# Patient Record
Sex: MALE | Race: WHITE | NOT HISPANIC OR LATINO | URBAN - METROPOLITAN AREA
[De-identification: names, ages, dates, MRNs, and addresses within clinical notes are randomized per-mention and may not be internally consistent; named-entity substitution may affect disease eponyms.]

---

## 2021-07-14 ENCOUNTER — APPOINTMENT (RX ONLY)
Dept: URBAN - METROPOLITAN AREA CLINIC 322 | Facility: CLINIC | Age: 26
Setting detail: DERMATOLOGY
End: 2021-07-14

## 2021-07-14 DIAGNOSIS — L21.8 OTHER SEBORRHEIC DERMATITIS: ICD-10-CM

## 2021-07-14 DIAGNOSIS — B07.8 OTHER VIRAL WARTS: ICD-10-CM

## 2021-07-14 DIAGNOSIS — L64.8 OTHER ANDROGENIC ALOPECIA: ICD-10-CM

## 2021-07-14 DIAGNOSIS — L65.0 TELOGEN EFFLUVIUM: ICD-10-CM

## 2021-07-14 PROCEDURE — ? FULL BODY SKIN EXAM - DECLINED

## 2021-07-14 PROCEDURE — ? PRESCRIPTION

## 2021-07-14 PROCEDURE — ? COUNSELING

## 2021-07-14 PROCEDURE — 99204 OFFICE O/P NEW MOD 45 MIN: CPT

## 2021-07-14 RX ORDER — KETOCONAZOLE 20 MG/ML
SHAMPOO, SUSPENSION TOPICAL
Qty: 1 | Refills: 2 | Status: ERX | COMMUNITY
Start: 2021-07-14

## 2021-07-14 RX ORDER — FINASTERIDE 1 MG/1
TABLET, FILM COATED ORAL
Qty: 90 | Refills: 3 | Status: ERX | COMMUNITY
Start: 2021-07-14

## 2021-07-14 RX ORDER — FLUOROURACIL 2 G/40G
CREAM TOPICAL QD
Qty: 1 | Refills: 1 | Status: ERX | COMMUNITY
Start: 2021-07-14

## 2021-07-14 RX ADMIN — FLUOROURACIL 1: 2 CREAM TOPICAL at 00:00

## 2021-07-14 RX ADMIN — KETOCONAZOLE 1: 20 SHAMPOO, SUSPENSION TOPICAL at 00:00

## 2021-07-14 RX ADMIN — FINASTERIDE 1: 1 TABLET, FILM COATED ORAL at 00:00

## 2021-07-14 ASSESSMENT — LOCATION SIMPLE DESCRIPTION DERM
LOCATION SIMPLE: LEFT SCALP
LOCATION SIMPLE: SCALP

## 2021-07-14 ASSESSMENT — LOCATION DETAILED DESCRIPTION DERM
LOCATION DETAILED: LEFT MEDIAL FRONTAL SCALP
LOCATION DETAILED: LEFT SUPERIOR PARIETAL SCALP

## 2021-07-14 ASSESSMENT — LOCATION ZONE DERM: LOCATION ZONE: SCALP

## 2021-07-14 NOTE — HPI: WARTS (VERRUCA)
Is This A New Presentation, Or A Follow-Up?: Warts
How Severe Are Your Warts?: mild
Additional History: Pt declines fbse today

## 2021-07-14 NOTE — PROCEDURE: COUNSELING
Detail Level: Zone
Patient Specific Counseling (Will Not Stick From Patient To Patient): Pt to start on ketoconazole 2% shampoo qday
Patient Specific Counseling (Will Not Stick From Patient To Patient): Pt to start on rogaine 5% qhs as tolerated, finasteride 1mg qday and discussed prp as treatment including risks vs benefits.  Pt to f/u for PRP
Patient Specific Counseling (Will Not Stick From Patient To Patient): Pt continue to have treated by [facility] and keep coming back.  pt to treat knees with efudex 5% cream bid for 2 weeks

## 2021-07-14 NOTE — HPI: HAIR LOSS
Previous Labs: No
How Did The Hair Loss Occur?: sudden in onset
How Severe Is Your Hair Loss?: mild
Additional History: Pt states he had COVID-19 in 06/2020 AND 01/2021, and experienced significant hair loss following illness.\\nPt states he had the COVID-19 vaccine 03/20/21 and 04/10/21.

## 2021-07-27 ENCOUNTER — APPOINTMENT (RX ONLY)
Dept: URBAN - METROPOLITAN AREA CLINIC 322 | Facility: CLINIC | Age: 26
Setting detail: DERMATOLOGY
End: 2021-07-27

## 2021-07-27 DIAGNOSIS — Z41.9 ENCOUNTER FOR PROCEDURE FOR PURPOSES OTHER THAN REMEDYING HEALTH STATE, UNSPECIFIED: ICD-10-CM

## 2021-07-27 PROCEDURE — ? PLATELET RICH PLASMA INJECTION

## 2021-07-27 ASSESSMENT — LOCATION DETAILED DESCRIPTION DERM: LOCATION DETAILED: POSTERIOR MID-PARIETAL SCALP

## 2021-07-27 ASSESSMENT — LOCATION SIMPLE DESCRIPTION DERM: LOCATION SIMPLE: POSTERIOR SCALP

## 2021-07-27 ASSESSMENT — LOCATION ZONE DERM: LOCATION ZONE: SCALP

## 2021-07-27 NOTE — PROCEDURE: PLATELET RICH PLASMA INJECTION
Price (Use Numbers Only, No Special Characters Or $): 6634 Price (Use Numbers Only, No Special Characters Or $): 2748

## 2021-07-27 NOTE — PROCEDURE: PLATELET RICH PLASMA INJECTION
Post-Care Instructions: After the procedure, take precautions agains sun exposure.  Advised would continue use of minoxidil within 48 hours of treatment.\\n\\nPt is purchasing a package of 3 prp for $1450 and proscriptx\\nToday is treatment 1/3

## 2021-09-10 ENCOUNTER — APPOINTMENT (RX ONLY)
Dept: URBAN - METROPOLITAN AREA CLINIC 322 | Facility: CLINIC | Age: 26
Setting detail: DERMATOLOGY
End: 2021-09-10

## 2021-09-10 DIAGNOSIS — Z41.9 ENCOUNTER FOR PROCEDURE FOR PURPOSES OTHER THAN REMEDYING HEALTH STATE, UNSPECIFIED: ICD-10-CM

## 2021-09-10 PROCEDURE — ? PLATELET RICH PLASMA INJECTION

## 2021-09-10 ASSESSMENT — LOCATION DETAILED DESCRIPTION DERM: LOCATION DETAILED: POSTERIOR MID-PARIETAL SCALP

## 2021-09-10 ASSESSMENT — LOCATION SIMPLE DESCRIPTION DERM: LOCATION SIMPLE: POSTERIOR SCALP

## 2021-09-10 ASSESSMENT — LOCATION ZONE DERM: LOCATION ZONE: SCALP

## 2021-09-10 NOTE — PROCEDURE: PLATELET RICH PLASMA INJECTION
Detail Level: Zone
Amount Injected At This Location In Cc (Will Not Render If 0): 0
Consent: Written consent obtained, risks reviewed including but not limited to pain, scarring, infection, bruising, HA, vascular events and incomplete improvement.  Patient understands the procedure is cosmetic in nature and will require out of pocket payment.
Post-Care Instructions: After the procedure, take precautions agains sun exposure.  Advised would continue use of minoxidil within 48 hours of treatment.\\n\\nPt is purchasing a package of 3 prp for $1450 and proscriptx\\nToday is treatment 2/3.  NO CHARGE for today treatment
Treatment Number (Optional): 2
Location #1: scalp
Which Technique?: Default
Show Additional Techniques: Yes
Standard Default Technique For Making Prp: Pt was advised on adequate hydration and eating 24 hours prior to procedure.  Pt was brought into room and in sterile fashion blood was drawn into 22ml eclipse tube with 24g butterfly needle.  Tube was mixed adequately with inversion 5x and was centrifuged at 3300rpm for a total 10 min.  PRP was then drawn up in sterile fashion into 3cc syringes.  Scalp was cleansed with hibiclens and alcohol preparatory wipes.  Injections were made every 1-2cm with 0.1-0.3cc aliquotes intradermally as well as subcutaneous (3-6mm depth) with 30g 1/2in needle.  A total of 12-15cc injected in scalp.  Hemostasis was obtained at all areas.  Scalp was cleansed with wound wash saline.  Pt advised on Ibuprofen for pain and may continue with normal hair regimen.  Call with any complications experienced.

## 2021-11-30 ENCOUNTER — APPOINTMENT (RX ONLY)
Dept: URBAN - METROPOLITAN AREA CLINIC 322 | Facility: CLINIC | Age: 26
Setting detail: DERMATOLOGY
End: 2021-11-30

## 2021-11-30 DIAGNOSIS — Z41.9 ENCOUNTER FOR PROCEDURE FOR PURPOSES OTHER THAN REMEDYING HEALTH STATE, UNSPECIFIED: ICD-10-CM

## 2021-11-30 PROCEDURE — ? PLATELET RICH PLASMA INJECTION

## 2021-11-30 ASSESSMENT — LOCATION DETAILED DESCRIPTION DERM: LOCATION DETAILED: POSTERIOR MID-PARIETAL SCALP

## 2021-11-30 ASSESSMENT — LOCATION ZONE DERM: LOCATION ZONE: SCALP

## 2021-11-30 ASSESSMENT — LOCATION SIMPLE DESCRIPTION DERM: LOCATION SIMPLE: POSTERIOR SCALP

## 2021-11-30 NOTE — PROCEDURE: PLATELET RICH PLASMA INJECTION
Detail Level: Zone
Amount Injected At This Location In Cc (Will Not Render If 0): 0
Consent: Written consent obtained, risks reviewed including but not limited to pain, scarring, infection, bruising, HA, vascular events and incomplete improvement.  Patient understands the procedure is cosmetic in nature and will require out of pocket payment.
Post-Care Instructions: After the procedure, take precautions agains sun exposure.  Advised would continue use of minoxidil within 48 hours of treatment.\\n\\nPt is purchasing a package of 3 prp for $1450 and proscriptx\\nToday is treatment 3/3. NO CHARGE for today treatment\\nPt to shift to maintenance treatment every 3 months
Treatment Number (Optional): 3
Location #1: scalp
Which Technique?: Default
Show Additional Techniques: Yes
Standard Default Technique For Making Prp: Pt was advised on adequate hydration and eating 24 hours prior to procedure.  Pt was brought into room and in sterile fashion blood was drawn into 22ml eclipse tube with 24g butterfly needle.  Tube was mixed adequately with inversion 5x and was centrifuged at 3300rpm for a total 10 min.  PRP was then drawn up in sterile fashion into 3cc syringes.  Scalp was cleansed with hibiclens and alcohol preparatory wipes.  Injections were made every 1-2cm with 0.1-0.3cc aliquotes intradermally as well as subcutaneous (3-6mm depth) with 30g 1/2in needle.  A total of 12-15cc injected in scalp.  Hemostasis was obtained at all areas.  Scalp was cleansed with wound wash saline.  Pt advised on Ibuprofen for pain and may continue with normal hair regimen.  Call with any complications experienced.

## 2022-01-03 ENCOUNTER — APPOINTMENT (RX ONLY)
Dept: URBAN - METROPOLITAN AREA CLINIC 322 | Facility: CLINIC | Age: 27
Setting detail: DERMATOLOGY
End: 2022-01-03

## 2022-01-03 DIAGNOSIS — B07.8 OTHER VIRAL WARTS: ICD-10-CM | Status: INADEQUATELY CONTROLLED

## 2022-01-03 PROCEDURE — 17110 DESTRUCTION B9 LES UP TO 14: CPT

## 2022-01-03 PROCEDURE — ? LIQUID NITROGEN

## 2022-01-03 PROCEDURE — ? PRESCRIPTION

## 2022-01-03 PROCEDURE — ? COUNSELING

## 2022-01-03 RX ORDER — IMIQUIMOD 50 MG/G
CREAM TOPICAL TIW
Qty: 12 | Refills: 3 | Status: ERX | COMMUNITY
Start: 2022-01-03

## 2022-01-03 RX ADMIN — IMIQUIMOD: 50 CREAM TOPICAL at 00:00

## 2022-01-03 ASSESSMENT — LOCATION ZONE DERM
LOCATION ZONE: FEET
LOCATION ZONE: FINGER
LOCATION ZONE: LEG

## 2022-01-03 ASSESSMENT — LOCATION SIMPLE DESCRIPTION DERM
LOCATION SIMPLE: RIGHT KNEE
LOCATION SIMPLE: LEFT KNEE
LOCATION SIMPLE: LEFT PLANTAR SURFACE
LOCATION SIMPLE: RIGHT THUMB

## 2022-01-03 ASSESSMENT — LOCATION DETAILED DESCRIPTION DERM
LOCATION DETAILED: LEFT PLANTAR FOREFOOT OVERLYING 1ST METATARSAL
LOCATION DETAILED: RIGHT DISTAL RADIAL THUMB
LOCATION DETAILED: LEFT KNEE
LOCATION DETAILED: LEFT MEDIAL PLANTAR MIDFOOT
LOCATION DETAILED: RIGHT KNEE

## 2022-01-03 NOTE — PROCEDURE: LIQUID NITROGEN
Show Aperture Variable?: Yes
Spray Paint Technique: No
Medical Necessity Clause: This procedure was medically necessary because the lesions that were treated were:
Post-Care Instructions: I reviewed with the patient in detail post-care instructions. Patient is to wear sunprotection, and avoid picking at any of the treated lesions. Pt may apply Vaseline to crusted or scabbing areas.
Medical Necessity Information: It is in your best interest to select a reason for this procedure from the list below. All of these items fulfill various CMS LCD requirements except the new and changing color options.
Detail Level: Detailed
Spray Paint Text: The liquid nitrogen was applied to the skin utilizing a spray paint frosting technique.
Number Of Freeze-Thaw Cycles: 3 freeze-thaw cycles
Consent: The patient's consent was obtained including but not limited to risks of crusting, scabbing, blistering, scarring, darker or lighter pigmentary change, recurrence, incomplete removal and infection.

## 2022-02-04 ENCOUNTER — APPOINTMENT (RX ONLY)
Dept: URBAN - METROPOLITAN AREA CLINIC 322 | Facility: CLINIC | Age: 27
Setting detail: DERMATOLOGY
End: 2022-02-04

## 2022-02-04 DIAGNOSIS — Z41.9 ENCOUNTER FOR PROCEDURE FOR PURPOSES OTHER THAN REMEDYING HEALTH STATE, UNSPECIFIED: ICD-10-CM

## 2022-02-04 PROCEDURE — ? PLATELET RICH PLASMA INJECTION

## 2022-02-04 ASSESSMENT — LOCATION ZONE DERM: LOCATION ZONE: SCALP

## 2022-02-04 ASSESSMENT — LOCATION DETAILED DESCRIPTION DERM: LOCATION DETAILED: POSTERIOR MID-PARIETAL SCALP

## 2022-02-04 ASSESSMENT — LOCATION SIMPLE DESCRIPTION DERM: LOCATION SIMPLE: POSTERIOR SCALP

## 2022-02-04 NOTE — PROCEDURE: PLATELET RICH PLASMA INJECTION
Price (Use Numbers Only, No Special Characters Or $): 2931 Price (Use Numbers Only, No Special Characters Or $): 0551

## 2022-02-04 NOTE — PROCEDURE: PLATELET RICH PLASMA INJECTION
Post-Care Instructions: After the procedure, take precautions agains sun exposure.  Advised would continue use of minoxidil within 48 hours of treatment.\\n\\nPt is purchasing a package of 3 prp for $1350\\nToday is treatment 1/3.\\nPt to shift to maintenance treatment every 3 months

## 2022-04-15 ENCOUNTER — RX ONLY (OUTPATIENT)
Age: 27
Setting detail: RX ONLY
End: 2022-04-15

## 2022-04-15 ENCOUNTER — APPOINTMENT (RX ONLY)
Dept: URBAN - METROPOLITAN AREA CLINIC 322 | Facility: CLINIC | Age: 27
Setting detail: DERMATOLOGY
End: 2022-04-15

## 2022-04-15 DIAGNOSIS — Z41.9 ENCOUNTER FOR PROCEDURE FOR PURPOSES OTHER THAN REMEDYING HEALTH STATE, UNSPECIFIED: ICD-10-CM

## 2022-04-15 PROCEDURE — ? PLATELET RICH PLASMA INJECTION

## 2022-04-15 RX ORDER — FLUOROURACIL 2 G/40G
CREAM TOPICAL
Qty: 40 | Refills: 0 | Status: ERX

## 2022-04-15 ASSESSMENT — LOCATION ZONE DERM: LOCATION ZONE: SCALP

## 2022-04-15 ASSESSMENT — LOCATION DETAILED DESCRIPTION DERM: LOCATION DETAILED: POSTERIOR MID-PARIETAL SCALP

## 2022-04-15 ASSESSMENT — LOCATION SIMPLE DESCRIPTION DERM: LOCATION SIMPLE: POSTERIOR SCALP

## 2022-04-15 NOTE — PROCEDURE: PLATELET RICH PLASMA INJECTION
Detail Level: Zone
Amount Injected At This Location In Cc (Will Not Render If 0): 0
Consent: Written consent obtained, risks reviewed including but not limited to pain, scarring, infection, bruising, HA, vascular events and incomplete improvement.  Patient understands the procedure is cosmetic in nature and will require out of pocket payment.
Post-Care Instructions: After the procedure, take precautions agains sun exposure.  Advised would continue use of minoxidil within 48 hours of treatment.\\n\\nPt purchsed a package of 3 prp for $1350\\nToday is treatment 2/3.  NO charge for today treatment\\nPt to shift to maintenance treatment every 3 months
Treatment Number (Optional): 5
Location #1: scalp
Which Technique?: Default
Show Additional Techniques: Yes
Standard Default Technique For Making Prp: Pt was advised on adequate hydration and eating 24 hours prior to procedure.  Pt was brought into room and in sterile fashion blood was drawn into 22ml eclipse tube with 24g butterfly needle.  Tube was mixed adequately with inversion 5x and was centrifuged at 3300rpm for a total 10 min.  PRP was then drawn up in sterile fashion into 3cc syringes.  Scalp was cleansed with hibiclens and alcohol preparatory wipes.  Injections were made every 1-2cm with 0.1-0.3cc aliquotes intradermally as well as subcutaneous (3-6mm depth) with 30g 1/2in needle.  A total of 12-15cc injected in scalp.  Hemostasis was obtained at all areas.  Scalp was cleansed with wound wash saline.  Pt advised on Ibuprofen for pain and may continue with normal hair regimen.  Call with any complications experienced.
Venipuncture Paragraph: An alcohol pad was applied to the venipuncture site. Venipuncture was performed using a butterfly needle. Pressure and a bandaid was applied to the site. No complications were noted.

## 2022-07-07 ENCOUNTER — APPOINTMENT (RX ONLY)
Dept: URBAN - METROPOLITAN AREA CLINIC 322 | Facility: CLINIC | Age: 27
Setting detail: DERMATOLOGY
End: 2022-07-07

## 2022-07-07 DIAGNOSIS — Z41.9 ENCOUNTER FOR PROCEDURE FOR PURPOSES OTHER THAN REMEDYING HEALTH STATE, UNSPECIFIED: ICD-10-CM

## 2022-07-07 PROCEDURE — ? PLATELET RICH PLASMA INJECTION

## 2022-07-07 ASSESSMENT — LOCATION SIMPLE DESCRIPTION DERM: LOCATION SIMPLE: POSTERIOR SCALP

## 2022-07-07 ASSESSMENT — LOCATION DETAILED DESCRIPTION DERM: LOCATION DETAILED: POSTERIOR MID-PARIETAL SCALP

## 2022-07-07 ASSESSMENT — LOCATION ZONE DERM: LOCATION ZONE: SCALP

## 2022-07-07 NOTE — PROCEDURE: PLATELET RICH PLASMA INJECTION
Detail Level: Zone
Amount Injected At This Location In Cc (Will Not Render If 0): 0
Consent: Written consent obtained, risks reviewed including but not limited to pain, scarring, infection, bruising, HA, vascular events and incomplete improvement.  Patient understands the procedure is cosmetic in nature and will require out of pocket payment.
Post-Care Instructions: After the procedure, take precautions agains sun exposure.  Advised would continue use of minoxidil within 48 hours of treatment.\\n\\nPt purchased a package of 3 treatments for $1350\\nToday is treatment 3/3.  NO charge for today’s treatment\\nPt to shift to maintenance treatment every 4 months
Treatment Number (Optional): 6
Location #1: scalp
Which Technique?: Default
Show Additional Techniques: Yes
Standard Default Technique For Making Prp: Pt was advised on adequate hydration and eating 24 hours prior to procedure.  Pt was brought into room and in sterile fashion blood was drawn into 22ml eclipse tube with 24g butterfly needle.  Tube was mixed adequately with inversion 5x and was centrifuged at 3300rpm for a total 10 min.  PRP was then drawn up in sterile fashion into 3cc syringes.  Scalp was cleansed with hibiclens and alcohol preparatory wipes.  Injections were made every 1-2cm with 0.1-0.3cc aliquotes intradermally as well as subcutaneous (3-6mm depth) with 30g 1/2in needle.  A total of 12-15cc injected in scalp.  Hemostasis was obtained at all areas.  Scalp was cleansed with wound wash saline.  Pt advised on Ibuprofen for pain and may continue with normal hair regimen.  Call with any complications experienced.
Venipuncture Paragraph: An alcohol pad was applied to the venipuncture site. Venipuncture was performed using a butterfly needle. Pressure and a bandaid was applied to the site. No complications were noted.

## 2022-08-16 ENCOUNTER — RX ONLY (OUTPATIENT)
Age: 27
Setting detail: RX ONLY
End: 2022-08-16

## 2022-08-16 RX ORDER — KETOCONAZOLE 20 MG/ML
SHAMPOO, SUSPENSION TOPICAL
Qty: 120 | Refills: 2 | Status: ERX

## 2022-10-21 ENCOUNTER — APPOINTMENT (RX ONLY)
Dept: URBAN - METROPOLITAN AREA CLINIC 322 | Facility: CLINIC | Age: 27
Setting detail: DERMATOLOGY
End: 2022-10-21

## 2022-10-21 DIAGNOSIS — Z41.9 ENCOUNTER FOR PROCEDURE FOR PURPOSES OTHER THAN REMEDYING HEALTH STATE, UNSPECIFIED: ICD-10-CM

## 2022-10-21 PROCEDURE — ? FULL BODY SKIN EXAM - DECLINED

## 2022-10-21 PROCEDURE — ? PLATELET RICH PLASMA INJECTION

## 2022-10-21 ASSESSMENT — LOCATION SIMPLE DESCRIPTION DERM: LOCATION SIMPLE: POSTERIOR SCALP

## 2022-10-21 ASSESSMENT — LOCATION ZONE DERM: LOCATION ZONE: SCALP

## 2022-10-21 ASSESSMENT — LOCATION DETAILED DESCRIPTION DERM: LOCATION DETAILED: POSTERIOR MID-PARIETAL SCALP

## 2022-10-21 NOTE — PROCEDURE: PLATELET RICH PLASMA INJECTION
Price (Use Numbers Only, No Special Characters Or $): 2454 Price (Use Numbers Only, No Special Characters Or $): 6337

## 2022-10-21 NOTE — PROCEDURE: PLATELET RICH PLASMA INJECTION
Post-Care Instructions: After the procedure, take precautions agains sun exposure.  Advised would continue use of minoxidil within 48 hours of treatment.\\n\\nPt purchased a new package of 3 treatments for $1350\\nToday is treatment 1/3.  \\nPt to shift to maintenance treatment every 4 months

## 2022-11-17 ENCOUNTER — APPOINTMENT (RX ONLY)
Dept: URBAN - METROPOLITAN AREA CLINIC 322 | Facility: CLINIC | Age: 27
Setting detail: DERMATOLOGY
End: 2022-11-17

## 2022-11-17 DIAGNOSIS — B07.8 OTHER VIRAL WARTS: ICD-10-CM

## 2022-11-17 PROCEDURE — ? FULL BODY SKIN EXAM - DECLINED

## 2022-11-17 PROCEDURE — ? COUNSELING

## 2022-11-17 PROCEDURE — 17110 DESTRUCTION B9 LES UP TO 14: CPT

## 2022-11-17 PROCEDURE — ? LIQUID NITROGEN

## 2022-11-17 ASSESSMENT — LOCATION DETAILED DESCRIPTION DERM
LOCATION DETAILED: LEFT INSTEP
LOCATION DETAILED: LEFT MEDIAL PLANTAR MIDFOOT
LOCATION DETAILED: RIGHT KNEE

## 2022-11-17 ASSESSMENT — LOCATION SIMPLE DESCRIPTION DERM
LOCATION SIMPLE: RIGHT KNEE
LOCATION SIMPLE: LEFT PLANTAR SURFACE

## 2022-11-17 ASSESSMENT — LOCATION ZONE DERM
LOCATION ZONE: FEET
LOCATION ZONE: LEG

## 2022-11-17 NOTE — PROCEDURE: LIQUID NITROGEN
Include Z78.9 (Other Specified Conditions Influencing Health Status) As An Associated Diagnosis?: No
Spray Paint Text: The liquid nitrogen was applied to the skin utilizing a spray paint frosting technique.
Total Number Of Lesions Treated: 5
Medical Necessity Clause: This procedure was medically necessary because the lesions that were treated were:
Detail Level: Detailed
Medical Necessity Information: It is in your best interest to select a reason for this procedure from the list below. All of these items fulfill various CMS LCD requirements except the new and changing color options.
Duration Of Freeze Thaw-Cycle (Seconds): 0
Post-Care Instructions: I reviewed with the patient in detail post-care instructions. Patient is to wear sunprotection, and avoid picking at any of the treated lesions. Pt may apply Vaseline to crusted or scabbing areas.
Consent: The patient's consent was obtained including but not limited to risks of crusting, scabbing, blistering, scarring, darker or lighter pigmentary change, recurrence, incomplete removal and infection.
Render Post Care In The Note?: yes

## 2022-12-23 ENCOUNTER — APPOINTMENT (RX ONLY)
Dept: URBAN - METROPOLITAN AREA CLINIC 322 | Facility: CLINIC | Age: 27
Setting detail: DERMATOLOGY
End: 2022-12-23

## 2022-12-23 DIAGNOSIS — Z41.9 ENCOUNTER FOR PROCEDURE FOR PURPOSES OTHER THAN REMEDYING HEALTH STATE, UNSPECIFIED: ICD-10-CM

## 2022-12-23 PROCEDURE — ? PLATELET RICH PLASMA INJECTION

## 2022-12-23 PROCEDURE — ? FULL BODY SKIN EXAM - DECLINED

## 2022-12-23 ASSESSMENT — LOCATION SIMPLE DESCRIPTION DERM: LOCATION SIMPLE: POSTERIOR SCALP

## 2022-12-23 ASSESSMENT — LOCATION ZONE DERM: LOCATION ZONE: SCALP

## 2022-12-23 ASSESSMENT — LOCATION DETAILED DESCRIPTION DERM: LOCATION DETAILED: POSTERIOR MID-PARIETAL SCALP

## 2022-12-23 NOTE — PROCEDURE: PLATELET RICH PLASMA INJECTION
Detail Level: Zone
Amount Injected At This Location In Cc (Will Not Render If 0): 0
Consent: Written consent obtained, risks reviewed including but not limited to pain, scarring, infection, bruising, HA, vascular events and incomplete improvement.  Patient understands the procedure is cosmetic in nature and will require out of pocket payment.
Post-Care Instructions: After the procedure, take precautions agains sun exposure.  Advised would continue use of minoxidil within 48 hours of treatment.\\n\\nPt purchased a new package of 3 treatments for $1350\\nToday is treatment 2/3.  \\nPt to shift to maintenance treatment every 4 months
Treatment Number (Optional): 7
Location #1: scalp
Price (Use Numbers Only, No Special Characters Or $): 450
Which Technique?: Default
Show Additional Techniques: Yes
Standard Default Technique For Making Prp: Pt was advised on adequate hydration and eating 24 hours prior to procedure.  Pt was brought into room and in sterile fashion blood was drawn into 22ml eclipse tube with 24g butterfly needle.  Tube was mixed adequately with inversion 5x and was centrifuged at 3300rpm for a total 10 min.  PRP was then drawn up in sterile fashion into 3cc syringes.  Scalp was cleansed with hibiclens and alcohol preparatory wipes.  Injections were made every 1-2cm with 0.1-0.3cc aliquotes intradermally as well as subcutaneous (3-6mm depth) with 30g 1/2in needle.  A total of 12-15cc injected in scalp.  Hemostasis was obtained at all areas.  Scalp was cleansed with wound wash saline.  Pt advised on Ibuprofen for pain and may continue with normal hair regimen.  Call with any complications experienced.
Site Of Venipuncture?: left arm
Who Performed The Venipuncture?: NW
Number Of Tubes Drawn: 1
Venipuncture Paragraph: An alcohol pad was applied to the venipuncture site. Venipuncture was performed using a butterfly needle. Pressure and a bandaid was applied to the site. No complications were noted.

## 2023-02-24 ENCOUNTER — APPOINTMENT (RX ONLY)
Dept: URBAN - METROPOLITAN AREA CLINIC 322 | Facility: CLINIC | Age: 28
Setting detail: DERMATOLOGY
End: 2023-02-24

## 2023-02-24 DIAGNOSIS — Z41.9 ENCOUNTER FOR PROCEDURE FOR PURPOSES OTHER THAN REMEDYING HEALTH STATE, UNSPECIFIED: ICD-10-CM

## 2023-02-24 PROCEDURE — ? PLATELET RICH PLASMA INJECTION

## 2023-02-24 PROCEDURE — ? FULL BODY SKIN EXAM - DECLINED

## 2023-02-24 ASSESSMENT — LOCATION ZONE DERM: LOCATION ZONE: SCALP

## 2023-02-24 ASSESSMENT — LOCATION SIMPLE DESCRIPTION DERM: LOCATION SIMPLE: POSTERIOR SCALP

## 2023-02-24 ASSESSMENT — LOCATION DETAILED DESCRIPTION DERM: LOCATION DETAILED: POSTERIOR MID-PARIETAL SCALP

## 2023-02-24 NOTE — PROCEDURE: PLATELET RICH PLASMA INJECTION
Detail Level: Zone
Amount Injected At This Location In Cc (Will Not Render If 0): 0
Consent: Written consent obtained, risks reviewed including but not limited to pain, scarring, infection, bruising, HA, vascular events and incomplete improvement.  Patient understands the procedure is cosmetic in nature and will require out of pocket payment.
Post-Care Instructions: After the procedure, take precautions agains sun exposure.  Advised would continue use of minoxidil within 48 hours of treatment.\\n\\nPt purchased a new package of 3 treatments for $1350\\nToday is treatment 3/3.  \\nPt to shift to maintenance treatment every 4 months
Treatment Number (Optional): 8
Location #1: scalp
Price (Use Numbers Only, No Special Characters Or $): 450
Which Technique?: Default
Show Additional Techniques: Yes
Standard Default Technique For Making Prp: Pt was advised on adequate hydration and eating 24 hours prior to procedure.  Pt was brought into room and in sterile fashion blood was drawn into 22ml eclipse tube with 24g butterfly needle.  Tube was mixed adequately with inversion 5x and was centrifuged at 3300rpm for a total 10 min.  PRP was then drawn up in sterile fashion into 3cc syringes.  Scalp was cleansed with hibiclens and alcohol preparatory wipes.  Injections were made every 1-2cm with 0.1-0.3cc aliquotes intradermally as well as subcutaneous (3-6mm depth) with 30g 1/2in needle.  A total of 12-15cc injected in scalp.  Hemostasis was obtained at all areas.  Scalp was cleansed with wound wash saline.  Pt advised on Ibuprofen for pain and may continue with normal hair regimen.  Call with any complications experienced.
Site Of Venipuncture?: left arm
Who Performed The Venipuncture?: [facility]
Number Of Tubes Drawn: 1
Venipuncture Paragraph: An alcohol pad was applied to the venipuncture site. Venipuncture was performed using a butterfly needle. Pressure and a bandaid was applied to the site. No complications were noted.

## 2023-06-02 ENCOUNTER — RX ONLY (OUTPATIENT)
Age: 28
Setting detail: RX ONLY
End: 2023-06-02

## 2023-06-02 ENCOUNTER — APPOINTMENT (RX ONLY)
Dept: URBAN - METROPOLITAN AREA CLINIC 322 | Facility: CLINIC | Age: 28
Setting detail: DERMATOLOGY
End: 2023-06-02

## 2023-06-02 DIAGNOSIS — Z41.9 ENCOUNTER FOR PROCEDURE FOR PURPOSES OTHER THAN REMEDYING HEALTH STATE, UNSPECIFIED: ICD-10-CM

## 2023-06-02 PROCEDURE — ? PLATELET RICH PLASMA INJECTION

## 2023-06-02 PROCEDURE — ? FULL BODY SKIN EXAM - DECLINED

## 2023-06-02 RX ORDER — KETOCONAZOLE 20 MG/ML
SHAMPOO, SUSPENSION TOPICAL
Qty: 120 | Refills: 10 | Status: ERX

## 2023-06-02 ASSESSMENT — LOCATION ZONE DERM: LOCATION ZONE: SCALP

## 2023-06-02 ASSESSMENT — LOCATION SIMPLE DESCRIPTION DERM: LOCATION SIMPLE: POSTERIOR SCALP

## 2023-06-02 ASSESSMENT — LOCATION DETAILED DESCRIPTION DERM: LOCATION DETAILED: POSTERIOR MID-PARIETAL SCALP

## 2023-06-02 NOTE — PROCEDURE: PLATELET RICH PLASMA INJECTION
Detail Level: Zone
Amount Injected At This Location In Cc (Will Not Render If 0): 0
Consent: Written consent obtained, risks reviewed including but not limited to pain, scarring, infection, bruising, HA, vascular events and incomplete improvement.  Patient understands the procedure is cosmetic in nature and will require out of pocket payment.
Post-Care Instructions: After the procedure, take precautions agains sun exposure.  Advised would continue use of minoxidil within 48 hours of treatment.\\n\\nPt to shift to maintenance treatment every 4 months; pt to start on ketoconazole shampoo to control seb derm
Treatment Number (Optional): 10
Location #1: scalp
Price (Use Numbers Only, No Special Characters Or $): 450
Which Technique?: Default
Show Additional Techniques: Yes
Standard Default Technique For Making Prp: Pt was advised on adequate hydration and eating 24 hours prior to procedure.  Pt was brought into room and in sterile fashion blood was drawn into 30 ml progen tube with 24g butterfly needle.  Tube was mixed adequately with inversion 5x and was centrifuged at 3300rpm for a total 10 min.  PRP was then drawn up in sterile fashion into 3cc syringes.  Scalp was cleansed with hibiclens and alcohol preparatory wipes.  Injections were made every 1-2cm with 0.1-0.3cc aliquotes intradermally as well as subcutaneous (3-6mm depth) with 30g 1/2in needle.  A total of 12-15cc injected in scalp.  Hemostasis was obtained at all areas.  Scalp was cleansed with wound wash saline.  Pt advised on Ibuprofen for pain and may continue with normal hair regimen.  Call with any complications experienced.
Site Of Venipuncture?: left arm
Who Performed The Venipuncture?: [facility]
Number Of Tubes Drawn: 1
Venipuncture Paragraph: An alcohol pad was applied to the venipuncture site. Venipuncture was performed using a butterfly needle. Pressure and a bandaid was applied to the site. No complications were noted.

## 2023-09-21 ENCOUNTER — RX ONLY (OUTPATIENT)
Age: 28
Setting detail: RX ONLY
End: 2023-09-21

## 2023-09-21 ENCOUNTER — APPOINTMENT (RX ONLY)
Dept: URBAN - METROPOLITAN AREA CLINIC 322 | Facility: CLINIC | Age: 28
Setting detail: DERMATOLOGY
End: 2023-09-21

## 2023-09-21 DIAGNOSIS — Z41.9 ENCOUNTER FOR PROCEDURE FOR PURPOSES OTHER THAN REMEDYING HEALTH STATE, UNSPECIFIED: ICD-10-CM

## 2023-09-21 PROCEDURE — ? PLATELET RICH PLASMA INJECTION

## 2023-09-21 PROCEDURE — ? FULL BODY SKIN EXAM - DECLINED

## 2023-09-21 RX ORDER — KETOCONAZOLE 20 MG/ML
SHAMPOO, SUSPENSION TOPICAL
Qty: 120 | Refills: 10 | Status: ERX

## 2023-09-21 ASSESSMENT — LOCATION ZONE DERM: LOCATION ZONE: SCALP

## 2023-09-21 ASSESSMENT — LOCATION SIMPLE DESCRIPTION DERM: LOCATION SIMPLE: POSTERIOR SCALP

## 2023-09-21 ASSESSMENT — LOCATION DETAILED DESCRIPTION DERM: LOCATION DETAILED: POSTERIOR MID-PARIETAL SCALP

## 2023-09-21 NOTE — PROCEDURE: PLATELET RICH PLASMA INJECTION
Detail Level: Zone
Amount Injected At This Location In Cc (Will Not Render If 0): 0
Consent: Written consent obtained, risks reviewed including but not limited to pain, scarring, infection, bruising, HA, vascular events and incomplete improvement.  Patient understands the procedure is cosmetic in nature and will require out of pocket payment.
Post-Care Instructions: After the procedure, take precautions agains sun exposure.  Advised would continue use of minoxidil within 48 hours of treatment.\\n\\nPt to shift to maintenance treatment every 4 months; pt to start on ketoconazole shampoo to control seb derm
Treatment Number (Optional): 11
Location #1: scalp
Price (Use Numbers Only, No Special Characters Or $): 450
Which Technique?: Default
Show Additional Techniques: Yes
Standard Default Technique For Making Prp: Pt was advised on adequate hydration and eating 24 hours prior to procedure.  Pt was brought into room and in sterile fashion blood was drawn into 30 ml progen tube with 24g butterfly needle.  Tube was mixed adequately with inversion 5x and was centrifuged at 3300rpm for a total 10 min.  PRP was then drawn up in sterile fashion into 3cc syringes.  Scalp was cleansed with hibiclens and alcohol preparatory wipes.  Injections were made every 1-2cm with 0.1-0.3cc aliquotes intradermally as well as subcutaneous (3-6mm depth) with 30g 1/2in needle.  A total of 12-15cc injected in scalp.  Hemostasis was obtained at all areas.  Scalp was cleansed with wound wash saline.  Pt advised on Ibuprofen for pain and may continue with normal hair regimen.  Call with any complications experienced.
Site Of Venipuncture?: left arm
Who Performed The Venipuncture?: [facility]
Number Of Tubes Drawn: 1
Venipuncture Paragraph: An alcohol pad was applied to the venipuncture site. Venipuncture was performed using a butterfly needle. Pressure and a bandaid was applied to the site. No complications were noted.

## 2023-12-10 NOTE — HPI: COSMETIC PROCEDURE
The defibrillation pads were placed in the posterior/lateral position. When Outside In The Sun, Do You...: never burns, always tans

## 2024-01-05 ENCOUNTER — APPOINTMENT (RX ONLY)
Dept: URBAN - METROPOLITAN AREA CLINIC 322 | Facility: CLINIC | Age: 29
Setting detail: DERMATOLOGY
End: 2024-01-05

## 2024-01-05 DIAGNOSIS — Z41.9 ENCOUNTER FOR PROCEDURE FOR PURPOSES OTHER THAN REMEDYING HEALTH STATE, UNSPECIFIED: ICD-10-CM

## 2024-01-05 PROCEDURE — ? FULL BODY SKIN EXAM - DECLINED

## 2024-01-05 PROCEDURE — ? PLATELET RICH PLASMA INJECTION

## 2024-01-05 ASSESSMENT — LOCATION SIMPLE DESCRIPTION DERM: LOCATION SIMPLE: POSTERIOR SCALP

## 2024-01-05 ASSESSMENT — LOCATION ZONE DERM: LOCATION ZONE: SCALP

## 2024-01-05 ASSESSMENT — LOCATION DETAILED DESCRIPTION DERM: LOCATION DETAILED: POSTERIOR MID-PARIETAL SCALP

## 2024-01-05 NOTE — PROCEDURE: PLATELET RICH PLASMA INJECTION
Detail Level: Zone
Amount Injected At This Location In Cc (Will Not Render If 0): 0
Consent: Written consent obtained, risks reviewed including but not limited to pain, scarring, infection, bruising, HA, vascular events and incomplete improvement.  Patient understands the procedure is cosmetic in nature and will require out of pocket payment.
Post-Care Instructions: After the procedure, take precautions agains sun exposure.  Advised would continue use of minoxidil within 48 hours of treatment.\\n\\nPt to shift to maintenance treatment every 4 months; pt to start on ketoconazole shampoo to control seb derm
Treatment Number (Optional): 12
Location #1: scalp
Price (Use Numbers Only, No Special Characters Or $): 450
Which Technique?: Default
Show Additional Techniques: Yes
Standard Default Technique For Making Prp: Pt was advised on adequate hydration and eating 24 hours prior to procedure.  Pt was brought into room and in sterile fashion blood was drawn into 22 ml cellenis tube with 24g butterfly needle.  Tube was mixed adequately with inversion 5x and was centrifuged at 3300rpm for a total 10 min.  PRP was then drawn up in sterile fashion into 3cc syringes.  Scalp was cleansed with hibiclens and alcohol preparatory wipes.  Injections were made every 1-2cm with 0.1-0.3cc aliquotes intradermally as well as subcutaneous (3-6mm depth) with 30g 1/2in needle.  A total of 12-15cc injected in scalp.  Hemostasis was obtained at all areas.  Scalp was cleansed with wound wash saline.  Pt advised on Ibuprofen for pain and may continue with normal hair regimen.  Call with any complications experienced.
Site Of Venipuncture?: left arm
Who Performed The Venipuncture?: [facility]
Number Of Tubes Drawn: 1
Venipuncture Paragraph: An alcohol pad was applied to the venipuncture site. Venipuncture was performed using a butterfly needle. Pressure and a bandaid was applied to the site. No complications were noted.

## 2024-01-05 NOTE — PROCEDURE: FULL BODY SKIN EXAM - DECLINED
No
Instructions: This plan will send the code FBSD to the PM system.  DO NOT or CHANGE the price.
Detail Level: Simple
Price (Do Not Change): 0.00

## 2024-01-11 ENCOUNTER — RX ONLY (OUTPATIENT)
Age: 29
Setting detail: RX ONLY
End: 2024-01-11

## 2024-01-11 RX ORDER — MINOXIDIL 2.5 MG/1
TABLET ORAL
Qty: 30 | Refills: 5 | Status: ERX | COMMUNITY
Start: 2024-01-11

## 2024-05-09 ENCOUNTER — APPOINTMENT (RX ONLY)
Dept: URBAN - METROPOLITAN AREA CLINIC 322 | Facility: CLINIC | Age: 29
Setting detail: DERMATOLOGY
End: 2024-05-09

## 2024-05-09 DIAGNOSIS — Z41.9 ENCOUNTER FOR PROCEDURE FOR PURPOSES OTHER THAN REMEDYING HEALTH STATE, UNSPECIFIED: ICD-10-CM

## 2024-05-09 PROCEDURE — ? PLATELET RICH PLASMA INJECTION

## 2024-05-09 PROCEDURE — ? FULL BODY SKIN EXAM - DECLINED

## 2024-05-09 ASSESSMENT — LOCATION DETAILED DESCRIPTION DERM: LOCATION DETAILED: POSTERIOR MID-PARIETAL SCALP

## 2024-05-09 ASSESSMENT — LOCATION SIMPLE DESCRIPTION DERM: LOCATION SIMPLE: POSTERIOR SCALP

## 2024-05-09 ASSESSMENT — LOCATION ZONE DERM: LOCATION ZONE: SCALP

## 2024-05-09 NOTE — PROCEDURE: PLATELET RICH PLASMA INJECTION
Detail Level: Zone
Amount Injected At This Location In Cc (Will Not Render If 0): 0
Consent: Written consent obtained, risks reviewed including but not limited to pain, scarring, infection, bruising, HA, vascular events and incomplete improvement.  Patient understands the procedure is cosmetic in nature and will require out of pocket payment.
Post-Care Instructions: After the procedure, take precautions agains sun exposure.  Advised would continue use of minoxidil within 48 hours of treatment.\\n\\nPt was doing maintenance at [id number] but do to amount of flare will f/u in 2-3 months to recapture; pt to start on ketoconazole shampoo to control seb derm
Treatment Number (Optional): 13
Location #1: scalp
Price (Use Numbers Only, No Special Characters Or $): 450
Which Technique?: Default
Show Additional Techniques: Yes
Standard Default Technique For Making Prp: Pt was advised on adequate hydration and eating 24 hours prior to procedure.  Pt was brought into room and in sterile fashion blood was drawn into 22 ml cellenis tube with 24g butterfly needle.  Tube was mixed adequately with inversion 5x and was centrifuged at 3300rpm for a total 10 min.  PRP was then drawn up in sterile fashion into 3cc syringes.  Scalp was cleansed with hibiclens and alcohol preparatory wipes.  Injections were made every 1-2cm with 0.1-0.3cc aliquotes intradermally as well as subcutaneous (3-6mm depth) with 30g 1/2in needle.  A total of 12-15cc injected in scalp.  Hemostasis was obtained at all areas.  Scalp was cleansed with wound wash saline.  Pt advised on Ibuprofen for pain and may continue with normal hair regimen.  Call with any complications experienced.
Site Of Venipuncture?: left arm
Who Performed The Venipuncture?: Rose Marie
Number Of Tubes Drawn: 1
Venipuncture Paragraph: An alcohol pad was applied to the venipuncture site. Venipuncture was performed using a butterfly needle. Pressure and a bandaid was applied to the site. No complications were noted.

## 2024-08-02 ENCOUNTER — APPOINTMENT (RX ONLY)
Dept: URBAN - METROPOLITAN AREA CLINIC 322 | Facility: CLINIC | Age: 29
Setting detail: DERMATOLOGY
End: 2024-08-02

## 2024-08-02 DIAGNOSIS — Z41.9 ENCOUNTER FOR PROCEDURE FOR PURPOSES OTHER THAN REMEDYING HEALTH STATE, UNSPECIFIED: ICD-10-CM

## 2024-08-02 PROCEDURE — ? PLATELET RICH PLASMA INJECTION

## 2024-08-02 PROCEDURE — ? FULL BODY SKIN EXAM - DECLINED

## 2024-08-02 ASSESSMENT — LOCATION ZONE DERM: LOCATION ZONE: SCALP

## 2024-08-02 ASSESSMENT — LOCATION SIMPLE DESCRIPTION DERM: LOCATION SIMPLE: POSTERIOR SCALP

## 2024-08-02 ASSESSMENT — LOCATION DETAILED DESCRIPTION DERM: LOCATION DETAILED: POSTERIOR MID-PARIETAL SCALP

## 2024-08-02 NOTE — PROCEDURE: PLATELET RICH PLASMA INJECTION
Detail Level: Zone
Amount Injected At This Location In Cc (Will Not Render If 0): 0
Consent: Written consent obtained, risks reviewed including but not limited to pain, scarring, infection, bruising, HA, vascular events and incomplete improvement.  Patient understands the procedure is cosmetic in nature and will require out of pocket payment.
Post-Care Instructions: After the procedure, take precautions agains sun exposure.  Advised would continue use of minoxidil within 48 hours of treatment.\\n\\nPt was doing maintenance at [id number] but do to amount of flare will f/u in 2-3 months to recapture; pt to start on ketoconazole shampoo to control seb derm; discussed concerns with possible scalp psoriasis.  Pt advised to be consistent with ketoconazole and f/u in 3-4 weeks to evaluate
Treatment Number (Optional): 14
Location #1: scalp
Price (Use Numbers Only, No Special Characters Or $): 450
Which Technique?: Default
Show Additional Techniques: Yes
Standard Default Technique For Making Prp: Pt was advised on adequate hydration and eating 24 hours prior to procedure.  Pt was brought into room and in sterile fashion blood was drawn into 22 ml cellenis tube with 24g butterfly needle.  Tube was mixed adequately with inversion 5x and was centrifuged at 3300rpm for a total 10 min.  PRP was then drawn up in sterile fashion into 3cc syringes.  Scalp was cleansed with hibiclens and alcohol preparatory wipes.  Injections were made every 1-2cm with 0.1-0.3cc aliquotes intradermally as well as subcutaneous (3-6mm depth) with 30g 1/2in needle.  A total of 12-15cc injected in scalp.  Hemostasis was obtained at all areas.  Scalp was cleansed with wound wash saline.  Pt advised on Ibuprofen for pain and may continue with normal hair regimen.  Call with any complications experienced.
Site Of Venipuncture?: left arm
Who Performed The Venipuncture?: Dalton
Number Of Tubes Drawn: 1
Venipuncture Paragraph: An alcohol pad was applied to the venipuncture site. Venipuncture was performed using a butterfly needle. Pressure and a bandaid was applied to the site. No complications were noted.

## 2024-08-30 ENCOUNTER — APPOINTMENT (RX ONLY)
Dept: URBAN - METROPOLITAN AREA CLINIC 322 | Facility: CLINIC | Age: 29
Setting detail: DERMATOLOGY
End: 2024-08-30

## 2024-08-30 DIAGNOSIS — L21.8 OTHER SEBORRHEIC DERMATITIS: ICD-10-CM

## 2024-08-30 DIAGNOSIS — L40.0 PSORIASIS VULGARIS: ICD-10-CM

## 2024-08-30 PROCEDURE — ? COUNSELING

## 2024-08-30 PROCEDURE — 99214 OFFICE O/P EST MOD 30 MIN: CPT

## 2024-08-30 PROCEDURE — ? PRESCRIPTION

## 2024-08-30 PROCEDURE — ? TREATMENT REGIMEN

## 2024-08-30 PROCEDURE — ? FULL BODY SKIN EXAM - DECLINED

## 2024-08-30 RX ORDER — ROFLUMILAST 3 MG/G
CREAM TOPICAL
Qty: 60 | Refills: 3 | Status: ERX | COMMUNITY
Start: 2024-08-30

## 2024-08-30 RX ADMIN — ROFLUMILAST: 3 CREAM TOPICAL at 00:00

## 2024-08-30 ASSESSMENT — LOCATION DETAILED DESCRIPTION DERM
LOCATION DETAILED: LEFT CYMBA CONCHA
LOCATION DETAILED: RIGHT SUPERIOR PARIETAL SCALP
LOCATION DETAILED: POSTERIOR MID-PARIETAL SCALP
LOCATION DETAILED: RIGHT CRUS OF HELIX

## 2024-08-30 ASSESSMENT — LOCATION SIMPLE DESCRIPTION DERM
LOCATION SIMPLE: SCALP
LOCATION SIMPLE: RIGHT EAR
LOCATION SIMPLE: POSTERIOR SCALP
LOCATION SIMPLE: LEFT EAR

## 2024-08-30 ASSESSMENT — PGA PSORIASIS: PGA PSORIASIS 2020: MILD

## 2024-08-30 ASSESSMENT — LOCATION ZONE DERM
LOCATION ZONE: EAR
LOCATION ZONE: SCALP

## 2024-08-30 ASSESSMENT — ITCH NUMERIC RATING SCALE: HOW SEVERE IS YOUR ITCHING?: 2

## 2024-08-30 ASSESSMENT — BSA PSORIASIS: % BODY COVERED IN PSORIASIS: 3

## 2024-08-30 NOTE — PROCEDURE: COUNSELING
Detail Level: Zone
Patient Specific Counseling (Will Not Stick From Patient To Patient): Pt has been more consistent with ketoconazole shampoo and notes his seb derm is completely controlled at this time.  Continue to use as needed for flares
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  Pt has relatively small BSA and states they do not bother him.  Pt to treat plaques as needed with zoryve cream bid, taper as better.  Advised if unable to control condition would recommend more aggressive therapy

## 2025-01-31 ENCOUNTER — APPOINTMENT (OUTPATIENT)
Dept: URBAN - METROPOLITAN AREA CLINIC 322 | Facility: CLINIC | Age: 30
Setting detail: DERMATOLOGY
End: 2025-01-31

## 2025-01-31 DIAGNOSIS — Z41.9 ENCOUNTER FOR PROCEDURE FOR PURPOSES OTHER THAN REMEDYING HEALTH STATE, UNSPECIFIED: ICD-10-CM

## 2025-01-31 PROCEDURE — ? FULL BODY SKIN EXAM - DECLINED

## 2025-01-31 PROCEDURE — ? PLATELET RICH PLASMA INJECTION

## 2025-01-31 ASSESSMENT — LOCATION DETAILED DESCRIPTION DERM: LOCATION DETAILED: POSTERIOR MID-PARIETAL SCALP

## 2025-01-31 ASSESSMENT — LOCATION SIMPLE DESCRIPTION DERM: LOCATION SIMPLE: POSTERIOR SCALP

## 2025-01-31 ASSESSMENT — LOCATION ZONE DERM: LOCATION ZONE: SCALP

## 2025-04-30 ENCOUNTER — APPOINTMENT (OUTPATIENT)
Dept: URBAN - METROPOLITAN AREA CLINIC 322 | Facility: CLINIC | Age: 30
Setting detail: DERMATOLOGY
End: 2025-04-30

## 2025-04-30 DIAGNOSIS — Z41.9 ENCOUNTER FOR PROCEDURE FOR PURPOSES OTHER THAN REMEDYING HEALTH STATE, UNSPECIFIED: ICD-10-CM

## 2025-04-30 PROCEDURE — ? PLATELET RICH PLASMA INJECTION

## 2025-04-30 ASSESSMENT — LOCATION ZONE DERM: LOCATION ZONE: SCALP

## 2025-04-30 ASSESSMENT — LOCATION SIMPLE DESCRIPTION DERM: LOCATION SIMPLE: POSTERIOR SCALP

## 2025-04-30 ASSESSMENT — LOCATION DETAILED DESCRIPTION DERM: LOCATION DETAILED: POSTERIOR MID-PARIETAL SCALP

## 2025-04-30 NOTE — PROCEDURE: PLATELET RICH PLASMA INJECTION
Detail Level: Zone
Amount Injected At This Location In Cc (Will Not Render If 0): 0
Consent: Written consent obtained, risks reviewed including but not limited to pain, scarring, infection, bruising, HA, vascular events and incomplete improvement.  Patient understands the procedure is cosmetic in nature and will require out of pocket payment.
Post-Care Instructions: After the procedure, take precautions agains sun exposure.  Advised would continue use of minoxidil within 48 hours of treatment.\\n\\nPt was doing maintenance at [id number]
Treatment Number (Optional): 16
Location #1: scalp
Price (Use Numbers Only, No Special Characters Or $): 450
Which Technique?: Default
Show Additional Techniques: Yes
Standard Default Technique For Making Prp: Pt was advised on adequate hydration and eating 24 hours prior to procedure.  Pt was brought into room and in sterile fashion blood was drawn into 22 ml cellenis tube with 24g butterfly needle.  Tube was mixed adequately with inversion 5x and was centrifuged at 3300rpm for a total 10 min.  PRP was then drawn up in sterile fashion into 3cc syringes.  Scalp was cleansed with hibiclens and alcohol preparatory wipes.  Injections were made every 1-2cm with 0.1-0.3cc aliquotes intradermally as well as subcutaneous (3-6mm depth) with 30g 1/2in needle.  A total of 12-15cc injected in scalp.  Hemostasis was obtained at all areas.  Scalp was cleansed with wound wash saline.  Pt advised on Ibuprofen for pain and may continue with normal hair regimen.  Call with any complications experienced.
Site Of Venipuncture?: left arm
Who Performed The Venipuncture?: Dalton
Number Of Tubes Drawn: 1
Venipuncture Paragraph: An alcohol pad was applied to the venipuncture site. Venipuncture was performed using a butterfly needle. Pressure and a bandaid was applied to the site. No complications were noted.

## 2025-08-05 ENCOUNTER — RX ONLY (RX ONLY)
Age: 30
End: 2025-08-05

## 2025-08-05 RX ORDER — KETOCONAZOLE 20 MG/ML
SHAMPOO, SUSPENSION TOPICAL
Qty: 120 | Refills: 0 | Status: ERX